# Patient Record
Sex: FEMALE | Race: WHITE | NOT HISPANIC OR LATINO | URBAN - METROPOLITAN AREA
[De-identification: names, ages, dates, MRNs, and addresses within clinical notes are randomized per-mention and may not be internally consistent; named-entity substitution may affect disease eponyms.]

---

## 2021-05-11 ENCOUNTER — TELEHEALTH PROVIDED OTHER THAN IN PATIENT'S HOME (OUTPATIENT)
Dept: URBAN - METROPOLITAN AREA TELEHEALTH 12 | Facility: TELEHEALTH | Age: 26
End: 2021-05-11

## 2021-05-11 VITALS — HEIGHT: 63 IN | WEIGHT: 174 LBS

## 2021-05-11 DIAGNOSIS — K21.9 GASTRO-ESOPHAGEAL REFLUX DISEASE WITHOUT ESOPHAGITIS: ICD-10-CM

## 2021-05-11 DIAGNOSIS — R10.13 EPIGASTRIC PAIN: ICD-10-CM

## 2021-05-11 PROCEDURE — 99204 OFFICE O/P NEW MOD 45 MIN: CPT | Mod: 95 | Performed by: NURSE PRACTITIONER

## 2021-05-11 NOTE — SERVICEHPINOTES
PATIENT VERIFIED BY DATE OF BIRTH AND NAME. Patient has been consented for this telecommunication visit. Reports a longstanding hx of stomach issues. Have not ate the best over 5 years, recently started healthy diet then developed severe acid reflux. Takes TUMs which does not help. BROccasional nausea without vomiting. Denies dysphagia. Pain at epigastric region. Denies unintentional weight loss. BRMoves bowel once daily. Denies blood in stool, melena or constipation.BROccasional diarrhea and cramps. Hx of drinking alcohol excessively from age 16 to 23. Denies taking NSAIDs. BRDenies family hx of colon cancer.  BR